# Patient Record
Sex: MALE | Race: BLACK OR AFRICAN AMERICAN | URBAN - METROPOLITAN AREA
[De-identification: names, ages, dates, MRNs, and addresses within clinical notes are randomized per-mention and may not be internally consistent; named-entity substitution may affect disease eponyms.]

---

## 2017-02-02 ENCOUNTER — TRANSFERRED RECORDS (OUTPATIENT)
Dept: HEALTH INFORMATION MANAGEMENT | Facility: CLINIC | Age: 47
End: 2017-02-02

## 2017-02-04 ENCOUNTER — TELEPHONE (OUTPATIENT)
Dept: NURSING | Facility: CLINIC | Age: 47
End: 2017-02-04

## 2017-02-04 NOTE — TELEPHONE ENCOUNTER
Call Type: Triage Call    Presenting Problem: Left hand numbness x 1 1/2 years, on and off.  Does repetitive movement at work. Numbness resolves with position  changes and massaging. No chest pain or SOB.  Triage Note:  Guideline Title: Hand Non-Injury  Recommended Disposition: See Provider within 72 Hours  Original Inclination: Wanted to speak with a nurse  Override Disposition:  Intended Action: Follow advice given  Physician Contacted: No  New or worsening numbness, weakness (dropping objects) or tingling of the  hand/fingers aggravated by repetitive use and relieved by shaking hand AND not  previously evaluated or not improving after 7 days of treatment ?  YES  Tingling in fingers OR around mouth AND rapid breathing rate ? NO  Pain in finger or discoloration after exposure to cold AND DOES NOT return to  normal with warming ? NO  Orthopedic hardware (metal plate, yevgeniy or screw) newly bulging under or through  skin ? NO  Unable to remove rings after using soap, lotion, oil, petroleum jelly or other  lubricants AND causing pain, discoloration or severe swelling ? NO  New pain in palm with moving fingers ? NO  Joint replacement (prosthesis) or any joint surgery AND new or unexplained pain,  popping, grating or snapping in same joint ? NO  Gradual onset or worsening numbness/tingling ? NO  New marked swelling (twice the normal size as compared to usual appearance) ? NO  Severe pain with movement that limits normal activities ? NO  Painful cold sore-like blister on tip of finger ? NO  New onset of severe pain AND change in sensation (numb, tingling, or no feeling),  change in color (pale or blue), feels cool to touch compared to other extremity.  ? NO  New painful tightness or marked swelling in palm, wrist, hand or fingers ? NO  New onset of unbearable pain within last 24 hours ? NO  New onset mild to moderate pain that has not improved with 48 hours of medical  treatment or home care ? NO  Extremity swelling or  limitation of range of motion AND known bleeding disorder OR  taking blood thinner, chemotherapy or transplant medications ? NO  Any new OR worsening signs and symptoms of soft tissue infection ? NO  Any signs and symptoms of soft tissue infection that have not improved with 48  hours of medical care ? NO  New unexplained weakness/paralysis, change in sensation (numbness or tingling) or  inability to purposely move, especially when one side of body is involved  occurring now or within last 4 hours ? NO  Physician Instructions:  Care Advice: Wrist splints available over the counter can be helpful,  particularly at night.  Remove any rings on the fingers of the affected hand, if possible.  SYMPTOM / CONDITION MANAGEMENT  Bend and straighten fingers hourly during waking hours.  Alternate tasks if  possible.  Hand care:   - Keep wrist(s) straight when doing repetitive hand work such  as working at a computer  try not to bend the wrist back or down.   - Wear gel-padded gloves when  using vibrating equipment.   - Relax  and use proper position when  driving, writing or using hand tools.   - Keep hands warm.   - Take breaks  when working with hands.   - Use ergonomic tools and equipment when  possible.

## 2017-05-16 ENCOUNTER — TRANSFERRED RECORDS (OUTPATIENT)
Dept: HEALTH INFORMATION MANAGEMENT | Facility: CLINIC | Age: 47
End: 2017-05-16

## 2017-05-31 ENCOUNTER — TRANSFERRED RECORDS (OUTPATIENT)
Dept: HEALTH INFORMATION MANAGEMENT | Facility: CLINIC | Age: 47
End: 2017-05-31

## 2017-06-13 ENCOUNTER — TRANSFERRED RECORDS (OUTPATIENT)
Dept: HEALTH INFORMATION MANAGEMENT | Facility: CLINIC | Age: 47
End: 2017-06-13

## 2018-09-07 ENCOUNTER — OFFICE VISIT (OUTPATIENT)
Dept: NEUROLOGY | Facility: CLINIC | Age: 48
End: 2018-09-07
Payer: COMMERCIAL

## 2018-09-07 DIAGNOSIS — G60.3 IDIOPATHIC PROGRESSIVE POLYNEUROPATHY: Primary | ICD-10-CM

## 2018-09-07 PROCEDURE — 95909 NRV CNDJ TST 5-6 STUDIES: CPT | Performed by: PSYCHIATRY & NEUROLOGY

## 2018-09-07 PROCEDURE — 95886 MUSC TEST DONE W/N TEST COMP: CPT | Performed by: PSYCHIATRY & NEUROLOGY

## 2018-09-07 NOTE — MR AVS SNAPSHOT
After Visit Summary   9/7/2018    Hamilton Blanc    MRN: [id number]           Patient Information     Date Of Birth          1970        Visit Information        Provider Department      9/7/2018 4:00 PM Juwan Wallace MD; PROC RM 1 PEDS [facility]        Today's Diagnoses     Idiopathic progressive polyneuropathy    -  1       Follow-ups after your visit        Who to contact     If you have questions or need follow up information about today's clinic visit or your schedule please contact [facility] directly at [phone].  Normal or non-critical lab and imaging results will be communicated to you by MyChart, letter or phone within 4 business days after the clinic has received the results. If you do not hear from us within 7 days, please contact the clinic through MyChart or phone. If you have a critical or abnormal lab result, we will notify you by phone as soon as possible.  Submit refill requests through MyCadbox or call your pharmacy and they will forward the refill request to us. Please allow 3 business days for your refill to be completed.          Additional Information About Your Visit        Care EveryWhere ID     This is your Care EveryWhere ID. This could be used by other organizations to access your [place] medical records  [id number]         Blood Pressure from Last 3 Encounters:   No data found for BP    Weight from Last 3 Encounters:   No data found for Wt              We Performed the Following     NCS Motor with or without F-Wave, 5-6 nerves ([id number])        Primary Care Provider Office Phone # Fax #    Zoltan Mckeon [phone]       [address] [facility] [address]        Equal Access to Services     [place] [facility] : Hadii ilana navarreteo Sobautista, waaxda luqadaha, qaybta kaalmada louie, amparo cartwright. So [facility] [phone].    ATENCIÓN: Si dinala español, tiene a rowe disposición  servicios gratuitos de asistencia lingüística. Angelina archer [phone].    We comply with applicable federal civil rights laws and [place] laws. We do not discriminate on the basis of race, color, national origin, age, disability, sex, sexual orientation, or gender identity.            Thank you!     Thank you for choosing [facility]  for your care. Our goal is always to provide you with excellent care. Hearing back from our patients is one way we can continue to improve our services. Please take a few minutes to complete the written survey that you may receive in the mail after your visit with us. Thank you!             Your Updated Medication List - Protect others around you: Learn how to safely use, store and throw away your medicines at www.disposemymeds.org.      Notice  As of 9/7/2018 11:59 PM    You have not been prescribed any medications.

## 2018-09-07 NOTE — LETTER
9/7/2018         RE: Hamilton Blanc  [address]  [facility] [address]        Dear Colleague,    Thank you for referring your patient, Hamilton Blanc, to the [facility]. Please see a copy of my visit note below.    [facility] EMG Laboratory      Nerve Conduction & EMG Report          Patient:       Hamilton Blanc  Patient ID:    [id number]  Gender:        Male  YOB: 1970  Age:           47 Years 11 Months      History and Examination:  Hamilton Blanc is a 47 year old man with a past history of surgery for left median and ulnar entrapments who now presents with sensory symptoms in the right hand and paresthesias in the right axilla. He is referred for evaluation of possible entrapment neuropathy.    Techniques:  Motor conduction studies were done with surface recording electrodes. Sensory conduction studies were performed with surface electrodes, unless indicated otherwise by (n), designating the use of subdermal recording electrodes. Temperature was monitored and recorded throughout the study. Upper extremities were maintained at a temperature of 32 degrees Centigrade or higher.  EMG was done with a concentric needle electrode.     Results:  The right median sensory nerve action potential was absent. A right ulnar sensory conduction study demonstrated moderate attenuation of amplitude and moderately severe slowing of conduction. A right radial sensory conduction study was normal.  A right median motor conduction study demonstrated moderately severe prolongation of distal latency and mild to moderate slowing of conduction. A right ulnar motor conduction study demonstrated mild slowing of conduction in all segments, greatest across the elbow. The latency to the second lumbrical was prolonged to a moderately severe degree. Right median and ulnar minimum F-response latencies were prolonged. Electromyography was normal.     Interpretation:  This is an  abnormal study, demonstrating electrophysiologic evidence of the followin. A sensorimotor polyneuropathy with non-congruent conduction slowing and accentuation across common entrapment sites. The primary process is likely a disorder of myelin. See comment.  2. In this context, there is evidence of moderately severe right median neuropathy at the wrist and possible mild right ulnar neuropathy at the elbow.     Comment:  Further evaluation, specifically comparison with previous electrodiagnostic studies and neurological consultation, may be helpful in establishing a unifying diagnosis.      Juwan Wallace MD        Sensory NCS      Nerve / Sites Rec. Site Onset Peak NP Amp Ref. PP Amp Dist Curtis Ref. Temp     ms ms  V  V  V cm m/s m/s  C   R MEDIAN - Dig II Anti      Wrist Dig II NR NR NR 10.0 NR 14 NR 48.0 32.4   R ULNAR - Dig V      Dig V Wrist 3.18 3.85 6.3 8.0 9.8 12.5 39.3 48.0 32.7   R RADIAL - Snuff      Forearm Snuff 2.08 2.71 24.9 15.0 34.5 10 48.0 48.0 32.6       Motor NCS      Nerve / Sites Rec. Site Lat Ref. Amp Ref. Rel Amp Dist Curtis Ref. Dur. Area Temp.     ms ms mV mV % cm m/s m/s ms %  C   R MEDIAN - APB      Wrist APB 7.45 4.40 10.2 5.0 100 8   8.85 100 33.4      Elbow APB 13.28  10.0  98 25.5 43.7 48.0 8.91 97.6 33.5   R ULNAR - ADM      Wrist ADM 3.44 3.50 11.4 5.0 100 8   7.08 100 33.3      B.Elbow ADM 8.28  10.6  92.8 23 47.5 48.0 6.67 94 33.4      A.Elbow ADM 10.78  10.8  94.5 10 40.0 48.0 6.67 101 33.4      Arm ADM 12.29  10.7  93.1 6.5 43.0 48.0 6.72 91.2 33.3   R MEDIAN - II Lumb      Median II Lumb 7.81  0.8  100 10   8.96 100 32.6      Ulnar Palm Int 3.65  4.1  484 10   5.57 252 32.6       F  Wave      Nerve Min F Lat Max F Lat Mean FLat Temp.    ms ms ms  C   R MEDIAN 40.57 46.25 42.50 33.3   R ULNAR 38.28 41.72 39.35 33.1       EMG Summary Table     Spontaneous MUAP Recruitment    IA Fib PSW Fasc H.F. Amp Dur. PPP Pattern   R. PRON TERES N None None None None N N N N   R. EXT DIG COMM N  None None None None N N N N   R. FIRST D INTEROSS N None None None None N N N N   R. BICEPS N None None None None N N N N   R. DELTOID N None None None None N N N N   R. C7 PSP N None None None None N N N N                              Again, thank you for allowing me to participate in the care of your patient.        Sincerely,        Juwan Wallace MD

## 2018-09-09 NOTE — PROGRESS NOTES
[facility] EMG Laboratory      Nerve Conduction & EMG Report          Patient:       Hamilton Blanc  Patient ID:    [id number]  Gender:        Male  YOB: 1970  Age:           47 Years 11 Months      History and Examination:  Hamilton Blanc is a 47 year old man with a past history of surgery for left median and ulnar entrapments who now presents with sensory symptoms in the right hand and paresthesias in the right axilla. He is referred for evaluation of possible entrapment neuropathy.    Techniques:  Motor conduction studies were done with surface recording electrodes. Sensory conduction studies were performed with surface electrodes, unless indicated otherwise by (n), designating the use of subdermal recording electrodes. Temperature was monitored and recorded throughout the study. Upper extremities were maintained at a temperature of 32 degrees Centigrade or higher.  EMG was done with a concentric needle electrode.     Results:  The right median sensory nerve action potential was absent. A right ulnar sensory conduction study demonstrated moderate attenuation of amplitude and moderately severe slowing of conduction. A right radial sensory conduction study was normal.  A right median motor conduction study demonstrated moderately severe prolongation of distal latency and mild to moderate slowing of conduction. A right ulnar motor conduction study demonstrated mild slowing of conduction in all segments, greatest across the elbow. The latency to the second lumbrical was prolonged to a moderately severe degree. Right median and ulnar minimum F-response latencies were prolonged. Electromyography was normal.     Interpretation:  This is an abnormal study, demonstrating electrophysiologic evidence of the followin. A sensorimotor polyneuropathy with non-congruent conduction slowing and accentuation across common entrapment sites. The primary process is likely a disorder of myelin.  See comment.  2. In this context, there is evidence of moderately severe right median neuropathy at the wrist and possible mild right ulnar neuropathy at the elbow.     Comment:  Further evaluation, specifically comparison with previous electrodiagnostic studies and neurological consultation, may be helpful in establishing a unifying diagnosis.      Juwan Wallace MD        Sensory NCS      Nerve / Sites Rec. Site Onset Peak NP Amp Ref. PP Amp Dist Curtis Ref. Temp     ms ms  V  V  V cm m/s m/s  C   R MEDIAN - Dig II Anti      Wrist Dig II NR NR NR 10.0 NR 14 NR 48.0 32.4   R ULNAR - Dig V      Dig V Wrist 3.18 3.85 6.3 8.0 9.8 12.5 39.3 48.0 32.7   R RADIAL - Snuff      Forearm Snuff 2.08 2.71 24.9 15.0 34.5 10 48.0 48.0 32.6       Motor NCS      Nerve / Sites Rec. Site Lat Ref. Amp Ref. Rel Amp Dist Curtis Ref. Dur. Area Temp.     ms ms mV mV % cm m/s m/s ms %  C   R MEDIAN - APB      Wrist APB 7.45 4.40 10.2 5.0 100 8   8.85 100 33.4      Elbow APB 13.28  10.0  98 25.5 43.7 48.0 8.91 97.6 33.5   R ULNAR - ADM      Wrist ADM 3.44 3.50 11.4 5.0 100 8   7.08 100 33.3      B.Elbow ADM 8.28  10.6  92.8 23 47.5 48.0 6.67 94 33.4      A.Elbow ADM 10.78  10.8  94.5 10 40.0 48.0 6.67 101 33.4      Arm ADM 12.29  10.7  93.1 6.5 43.0 48.0 6.72 91.2 33.3   R MEDIAN - II Lumb      Median II Lumb 7.81  0.8  100 10   8.96 100 32.6      Ulnar Palm Int 3.65  4.1  484 10   5.57 252 32.6       F  Wave      Nerve Min F Lat Max F Lat Mean FLat Temp.    ms ms ms  C   R MEDIAN 40.57 46.25 42.50 33.3   R ULNAR 38.28 41.72 39.35 33.1       EMG Summary Table     Spontaneous MUAP Recruitment    IA Fib PSW Fasc H.F. Amp Dur. PPP Pattern   R. PRON TERES N None None None None N N N N   R. EXT DIG COMM N None None None None N N N N   R. FIRST D INTEROSS N None None None None N N N N   R. BICEPS N None None None None N N N N   R. DELTOID N None None None None N N N N   R. C7 PSP N None None None None N N N N

## 2018-09-10 ENCOUNTER — TELEPHONE (OUTPATIENT)
Dept: NURSING | Facility: CLINIC | Age: 48
End: 2018-09-10

## 2018-09-10 NOTE — TELEPHONE ENCOUNTER
Dr. Christopher Weaver Called at [phone] and spoke with Dr. Weaver nurse. She will give Dr. Weaver the message below and I faxed over the EMG report to [phone] along with message if any questions.    Katelynn Thacker LPN

## 2018-09-10 NOTE — TELEPHONE ENCOUNTER
----- Message from Juwan Wallace MD sent at 9/9/2018  4:33 PM CDT -----  Regarding: followup  Please notify PCP Christopher Weaver that I recommend neurology consultation for further evaluation of this person's condition. 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 I will be out of the office Monday but would be happy to discuss with him on Tuesday. Thanks.